# Patient Record
Sex: MALE | Race: WHITE | NOT HISPANIC OR LATINO | URBAN - METROPOLITAN AREA
[De-identification: names, ages, dates, MRNs, and addresses within clinical notes are randomized per-mention and may not be internally consistent; named-entity substitution may affect disease eponyms.]

---

## 2020-02-13 PROBLEM — 398050005 DIVERTICULAR DISEASE OF COLON: Status: ACTIVE | Noted: 2020-02-13

## 2024-01-30 ENCOUNTER — LAB OUTSIDE AN ENCOUNTER (OUTPATIENT)
Dept: URBAN - METROPOLITAN AREA CLINIC 31 | Facility: CLINIC | Age: 74
End: 2024-01-30

## 2024-01-30 ENCOUNTER — OFFICE VISIT (OUTPATIENT)
Dept: URBAN - METROPOLITAN AREA CLINIC 31 | Facility: CLINIC | Age: 74
End: 2024-01-30
Payer: COMMERCIAL

## 2024-01-30 VITALS
HEIGHT: 68 IN | WEIGHT: 212 LBS | BODY MASS INDEX: 32.13 KG/M2 | OXYGEN SATURATION: 98 % | SYSTOLIC BLOOD PRESSURE: 100 MMHG | DIASTOLIC BLOOD PRESSURE: 40 MMHG | HEART RATE: 58 BPM

## 2024-01-30 DIAGNOSIS — K86.2 PANCREAS CYST: ICD-10-CM

## 2024-01-30 DIAGNOSIS — Z86.010 HX OF ADENOMATOUS COLONIC POLYPS: ICD-10-CM

## 2024-01-30 PROBLEM — 429047008: Status: ACTIVE | Noted: 2024-01-30

## 2024-01-30 PROCEDURE — 99204 OFFICE O/P NEW MOD 45 MIN: CPT | Performed by: STUDENT IN AN ORGANIZED HEALTH CARE EDUCATION/TRAINING PROGRAM

## 2024-01-30 RX ORDER — ATORVASTATIN CALCIUM, FILM COATED 20 MG/1
1 TABLET TABLET ORAL ONCE A DAY
Qty: 90 TABLET | Status: ON HOLD | COMMUNITY

## 2024-01-30 RX ORDER — ASPIRIN 81 MG/1
1 TABLET TABLET, COATED ORAL ONCE A DAY
Status: ACTIVE | COMMUNITY

## 2024-01-30 RX ORDER — LISINOPRIL 40 MG/1
1 TABLET TABLET ORAL ONCE A DAY
Status: ACTIVE | COMMUNITY

## 2024-01-30 RX ORDER — SODIUM SULFATE, POTASSIUM SULFATE, MAGNESIUM SULFATE 17.5; 3.13; 1.6 G/ML; G/ML; G/ML
AS DIRECTED SOLUTION, CONCENTRATE ORAL
Qty: 1 | Status: ON HOLD | COMMUNITY
Start: 2019-12-09

## 2024-01-30 RX ORDER — AMLODIPINE BESYLATE 2.5 MG/1
1 TABLET TABLET ORAL ONCE A DAY
Status: ACTIVE | COMMUNITY

## 2024-01-30 RX ORDER — SODIUM, POTASSIUM,MAG SULFATES 17.5-3.13G
ML AS DIRECTED SOLUTION, RECONSTITUTED, ORAL ORAL
Qty: 1 KIT | Refills: 0 | OUTPATIENT
Start: 2024-01-30 | End: 2024-02-01

## 2024-01-30 RX ORDER — SODIUM SULFATE, POTASSIUM SULFATE, MAGNESIUM SULFATE 17.5; 3.13; 1.6 G/ML; G/ML; G/ML
AS DIRECTED SOLUTION, CONCENTRATE ORAL AS DIRECTED
Refills: 0 | Status: ON HOLD | COMMUNITY
Start: 2019-05-14

## 2024-01-30 RX ORDER — VITAMIN B COMPLEX
AS DIRECTED CAPSULE ORAL ONCE A DAY
Status: ACTIVE | COMMUNITY

## 2024-01-30 RX ORDER — SPIRONOLACTONE 25 MG/1
1 TABLET TABLET ORAL ONCE A DAY
Status: ACTIVE | COMMUNITY

## 2024-01-30 RX ORDER — ALLOPURINOL 300 MG/1
1 TABLET TABLET ORAL ONCE A DAY
Qty: 90 TABLET | Status: ACTIVE | COMMUNITY

## 2024-01-30 RX ORDER — ATORVASTATIN CALCIUM, FILM COATED 20 MG/1
TABLET ORAL
Qty: 90 TABLET | Status: ON HOLD | COMMUNITY

## 2024-01-30 NOTE — HPI-TODAY'S VISIT:
73 year old male patient presents today for adenoma surveillance, and on pancreatic cyst surveillance. Last imaging 2019, reports had prior imaging Yo 2017 with dx of cyst. No abdominal pain. Eating well, weight stable, no weight loss. Denies constipation, denies diarrhea, infrequent mild blood attributes to hemorrhoids. Mother hx pancreatic cancer, diagnosed at age 85. Denies UGI symptoms including heartburn, dysphagia.  Reports gets labs with PCP, denies liver test abnormalities. Hx steatosis on imaging. HX cad, hx PCI, no hx CVA. No chest pain or dyspnea.  Last Colonoscopy 01/30/2020: - Hemorrhoids, second degree - Slightly inflamed rectal tag suspected to have been related to prior anal fissure - Diverticulosis of large intestine - Benign neoplasm of transverse colon - Benign neoplasm of descending colon - Benign neoplasm of  sigmoid colon  Pathology report of 01/30/2020: 1. Colon, Transverse, Polyp Tubular Adenoma 2. Colon, Descending, Poly Hyperplastic polyp 3. Colon, Sigmoid, Polyp Sessile serrated polyp (aka serrated adenoma), fragments of.  CT Abdomen and Pelvis w/contrast 05/29/2019 1. 1.7 cm cystic lesion extending anteriorly from the pancreatic head. No pancreatic ductal dilatation. Recommend correlation with prior imaging. If no prior imaging is available, follow-up exam is recommended in 6 months 2. Hepatic steatosis 3. Osseous changes suspicious for ankylosing spondylitis  US RUQ 10/29/2018 - Stable small pancreatic head cyst 1.4 cm - Hepatic steatosis  CT Abdomen + Pelvis w/ IV Contrast 07/21/2018 1. Mild inflammatory changes surrounding lymph node in the left groin measuring1.9 cm. No drainable collection or free gas. Smaller adjacent reactive lymph nodes.2. Prostatomegaly.3. Hepatic steatosis.   XR Chest 2 Views PA + Lat Stnd Protocol No acute abnormality.

## 2024-02-16 ENCOUNTER — LAB (OUTPATIENT)
Dept: URBAN - METROPOLITAN AREA CLINIC 35 | Facility: CLINIC | Age: 74
End: 2024-02-16

## 2024-03-04 ENCOUNTER — COLON (OUTPATIENT)
Dept: URBAN - METROPOLITAN AREA SURGERY CENTER 8 | Facility: SURGERY CENTER | Age: 74
End: 2024-03-04

## 2024-04-04 ENCOUNTER — COLON (OUTPATIENT)
Dept: URBAN - METROPOLITAN AREA SURGERY CENTER 8 | Facility: SURGERY CENTER | Age: 74
End: 2024-04-04
Payer: MEDICARE

## 2024-04-04 ENCOUNTER — LAB (OUTPATIENT)
Dept: URBAN - METROPOLITAN AREA CLINIC 4 | Facility: CLINIC | Age: 74
End: 2024-04-04
Payer: MEDICARE

## 2024-04-04 DIAGNOSIS — D12.2 ADENOMA OF ASCENDING COLON: ICD-10-CM

## 2024-04-04 DIAGNOSIS — Z86.010 ADENOMAS PERSONAL HISTORY OF COLONIC POLYPS: ICD-10-CM

## 2024-04-04 DIAGNOSIS — D12.2 BENIGN NEOPLASM OF ASCENDING COLON: ICD-10-CM

## 2024-04-04 DIAGNOSIS — K63.5 BENIGN COLON POLYP: ICD-10-CM

## 2024-04-04 DIAGNOSIS — K63.89 OTHER SPECIFIED DISEASES OF INTESTINE: ICD-10-CM

## 2024-04-04 PROCEDURE — 88305 TISSUE EXAM BY PATHOLOGIST: CPT | Performed by: STUDENT IN AN ORGANIZED HEALTH CARE EDUCATION/TRAINING PROGRAM

## 2024-04-04 PROCEDURE — 45385 COLONOSCOPY W/LESION REMOVAL: CPT | Performed by: STUDENT IN AN ORGANIZED HEALTH CARE EDUCATION/TRAINING PROGRAM

## 2024-04-04 RX ORDER — SODIUM SULFATE, POTASSIUM SULFATE, MAGNESIUM SULFATE 17.5; 3.13; 1.6 G/ML; G/ML; G/ML
AS DIRECTED SOLUTION, CONCENTRATE ORAL
Qty: 1 | Status: ON HOLD | COMMUNITY
Start: 2019-12-09

## 2024-04-04 RX ORDER — LISINOPRIL 40 MG/1
1 TABLET TABLET ORAL ONCE A DAY
Status: ACTIVE | COMMUNITY

## 2024-04-04 RX ORDER — SPIRONOLACTONE 25 MG/1
1 TABLET TABLET ORAL ONCE A DAY
Status: ACTIVE | COMMUNITY

## 2024-04-04 RX ORDER — AMLODIPINE BESYLATE 2.5 MG/1
1 TABLET TABLET ORAL ONCE A DAY
Status: ACTIVE | COMMUNITY

## 2024-04-04 RX ORDER — VITAMIN B COMPLEX
AS DIRECTED CAPSULE ORAL ONCE A DAY
Status: ACTIVE | COMMUNITY

## 2024-04-04 RX ORDER — ATORVASTATIN CALCIUM, FILM COATED 20 MG/1
TABLET ORAL
Qty: 90 TABLET | Status: ON HOLD | COMMUNITY

## 2024-04-04 RX ORDER — ASPIRIN 81 MG/1
1 TABLET TABLET, COATED ORAL ONCE A DAY
Status: ACTIVE | COMMUNITY

## 2024-04-04 RX ORDER — ATORVASTATIN CALCIUM, FILM COATED 20 MG/1
1 TABLET TABLET ORAL ONCE A DAY
Qty: 90 TABLET | Status: ON HOLD | COMMUNITY

## 2024-04-04 RX ORDER — SODIUM SULFATE, POTASSIUM SULFATE, MAGNESIUM SULFATE 17.5; 3.13; 1.6 G/ML; G/ML; G/ML
AS DIRECTED SOLUTION, CONCENTRATE ORAL AS DIRECTED
Refills: 0 | Status: ON HOLD | COMMUNITY
Start: 2019-05-14

## 2024-04-04 RX ORDER — ALLOPURINOL 300 MG/1
1 TABLET TABLET ORAL ONCE A DAY
Qty: 90 TABLET | Status: ACTIVE | COMMUNITY

## 2024-04-12 ENCOUNTER — EUS (OUTPATIENT)
Dept: URBAN - METROPOLITAN AREA MEDICAL CENTER 28 | Facility: MEDICAL CENTER | Age: 74
End: 2024-04-12
Payer: MEDICARE

## 2024-04-12 DIAGNOSIS — K22.89 OTHER SPECIFIED DISEASE OF ESOPHAGUS: ICD-10-CM

## 2024-04-12 DIAGNOSIS — K86.89 OTHER SPECIFIED DISEASES OF PANCREAS: ICD-10-CM

## 2024-04-12 DIAGNOSIS — R93.3 ABN FINDINGS-GI TRACT: ICD-10-CM

## 2024-04-12 PROCEDURE — 43239 EGD BIOPSY SINGLE/MULTIPLE: CPT | Performed by: INTERNAL MEDICINE

## 2024-04-12 PROCEDURE — 43242 EGD US FINE NEEDLE BX/ASPIR: CPT | Performed by: INTERNAL MEDICINE

## 2024-04-12 RX ORDER — SODIUM SULFATE, POTASSIUM SULFATE, MAGNESIUM SULFATE 17.5; 3.13; 1.6 G/ML; G/ML; G/ML
AS DIRECTED SOLUTION, CONCENTRATE ORAL
Qty: 1 | Status: ON HOLD | COMMUNITY
Start: 2019-12-09

## 2024-04-12 RX ORDER — ALLOPURINOL 300 MG/1
1 TABLET TABLET ORAL ONCE A DAY
Qty: 90 TABLET | Status: ACTIVE | COMMUNITY

## 2024-04-12 RX ORDER — ATORVASTATIN CALCIUM, FILM COATED 20 MG/1
TABLET ORAL
Qty: 90 TABLET | Status: ON HOLD | COMMUNITY

## 2024-04-12 RX ORDER — AMLODIPINE BESYLATE 2.5 MG/1
1 TABLET TABLET ORAL ONCE A DAY
Status: ACTIVE | COMMUNITY

## 2024-04-12 RX ORDER — VITAMIN B COMPLEX
AS DIRECTED CAPSULE ORAL ONCE A DAY
Status: ACTIVE | COMMUNITY

## 2024-04-12 RX ORDER — ATORVASTATIN CALCIUM, FILM COATED 20 MG/1
1 TABLET TABLET ORAL ONCE A DAY
Qty: 90 TABLET | Status: ON HOLD | COMMUNITY

## 2024-04-12 RX ORDER — SPIRONOLACTONE 25 MG/1
1 TABLET TABLET ORAL ONCE A DAY
Status: ACTIVE | COMMUNITY

## 2024-04-12 RX ORDER — SODIUM SULFATE, POTASSIUM SULFATE, MAGNESIUM SULFATE 17.5; 3.13; 1.6 G/ML; G/ML; G/ML
AS DIRECTED SOLUTION, CONCENTRATE ORAL AS DIRECTED
Refills: 0 | Status: ON HOLD | COMMUNITY
Start: 2019-05-14

## 2024-04-12 RX ORDER — ASPIRIN 81 MG/1
1 TABLET TABLET, COATED ORAL ONCE A DAY
Status: ACTIVE | COMMUNITY

## 2024-04-12 RX ORDER — LISINOPRIL 40 MG/1
1 TABLET TABLET ORAL ONCE A DAY
Status: ACTIVE | COMMUNITY

## 2024-05-07 ENCOUNTER — OFFICE VISIT (OUTPATIENT)
Dept: URBAN - METROPOLITAN AREA CLINIC 31 | Facility: CLINIC | Age: 74
End: 2024-05-07
Payer: MEDICARE

## 2024-05-07 ENCOUNTER — DASHBOARD ENCOUNTERS (OUTPATIENT)
Age: 74
End: 2024-05-07

## 2024-05-07 ENCOUNTER — LAB OUTSIDE AN ENCOUNTER (OUTPATIENT)
Dept: URBAN - METROPOLITAN AREA CLINIC 31 | Facility: CLINIC | Age: 74
End: 2024-05-07

## 2024-05-07 VITALS
WEIGHT: 208 LBS | HEIGHT: 68 IN | OXYGEN SATURATION: 98 % | HEART RATE: 62 BPM | SYSTOLIC BLOOD PRESSURE: 114 MMHG | BODY MASS INDEX: 31.52 KG/M2 | DIASTOLIC BLOOD PRESSURE: 60 MMHG

## 2024-05-07 DIAGNOSIS — K22.10 EROSIVE ESOPHAGITIS: ICD-10-CM

## 2024-05-07 DIAGNOSIS — K86.2 PANCREAS CYST: ICD-10-CM

## 2024-05-07 DIAGNOSIS — Z86.010 HX OF ADENOMATOUS COLONIC POLYPS: ICD-10-CM

## 2024-05-07 DIAGNOSIS — K76.0 FATTY LIVER: ICD-10-CM

## 2024-05-07 PROBLEM — 40719004: Status: ACTIVE | Noted: 2024-05-07

## 2024-05-07 PROBLEM — 197321007: Status: ACTIVE | Noted: 2024-05-07

## 2024-05-07 PROCEDURE — 99214 OFFICE O/P EST MOD 30 MIN: CPT | Performed by: STUDENT IN AN ORGANIZED HEALTH CARE EDUCATION/TRAINING PROGRAM

## 2024-05-07 RX ORDER — ALLOPURINOL 300 MG/1
1 TABLET TABLET ORAL ONCE A DAY
Qty: 90 TABLET | Status: ACTIVE | COMMUNITY

## 2024-05-07 RX ORDER — ASPIRIN 81 MG/1
1 TABLET TABLET, COATED ORAL ONCE A DAY
Status: ACTIVE | COMMUNITY

## 2024-05-07 RX ORDER — ATORVASTATIN CALCIUM, FILM COATED 20 MG/1
1 TABLET TABLET ORAL ONCE A DAY
Qty: 90 TABLET | Status: ON HOLD | COMMUNITY

## 2024-05-07 RX ORDER — SPIRONOLACTONE 25 MG/1
1 TABLET TABLET ORAL ONCE A DAY
Status: ACTIVE | COMMUNITY

## 2024-05-07 RX ORDER — AMLODIPINE BESYLATE 2.5 MG/1
1 TABLET TABLET ORAL ONCE A DAY
Status: ACTIVE | COMMUNITY

## 2024-05-07 RX ORDER — SODIUM SULFATE, POTASSIUM SULFATE, MAGNESIUM SULFATE 17.5; 3.13; 1.6 G/ML; G/ML; G/ML
AS DIRECTED SOLUTION, CONCENTRATE ORAL AS DIRECTED
Refills: 0 | Status: ON HOLD | COMMUNITY
Start: 2019-05-14

## 2024-05-07 RX ORDER — ATORVASTATIN CALCIUM, FILM COATED 20 MG/1
TABLET ORAL
Qty: 90 TABLET | Status: ON HOLD | COMMUNITY

## 2024-05-07 RX ORDER — PANTOPRAZOLE SODIUM 40 MG/1
1 TABLET TABLET, DELAYED RELEASE ORAL ONCE A DAY
Qty: 60 TABLET | Refills: 1 | OUTPATIENT
Start: 2024-05-07

## 2024-05-07 RX ORDER — VITAMIN B COMPLEX
AS DIRECTED CAPSULE ORAL ONCE A DAY
Status: ACTIVE | COMMUNITY

## 2024-05-07 RX ORDER — SODIUM SULFATE, POTASSIUM SULFATE, MAGNESIUM SULFATE 17.5; 3.13; 1.6 G/ML; G/ML; G/ML
AS DIRECTED SOLUTION, CONCENTRATE ORAL
Qty: 1 | Status: ON HOLD | COMMUNITY
Start: 2019-12-09

## 2024-05-07 RX ORDER — LISINOPRIL 40 MG/1
1 TABLET TABLET ORAL ONCE A DAY
Status: ACTIVE | COMMUNITY

## 2024-11-12 ENCOUNTER — OFFICE VISIT (OUTPATIENT)
Dept: URBAN - METROPOLITAN AREA CLINIC 31 | Facility: CLINIC | Age: 74
End: 2024-11-12

## 2024-12-04 ENCOUNTER — TELEPHONE ENCOUNTER (OUTPATIENT)
Dept: URBAN - METROPOLITAN AREA CLINIC 35 | Facility: CLINIC | Age: 74
End: 2024-12-04

## 2024-12-31 ENCOUNTER — OFFICE VISIT (OUTPATIENT)
Dept: URBAN - METROPOLITAN AREA CLINIC 31 | Facility: CLINIC | Age: 74
End: 2024-12-31